# Patient Record
Sex: FEMALE | Race: WHITE
[De-identification: names, ages, dates, MRNs, and addresses within clinical notes are randomized per-mention and may not be internally consistent; named-entity substitution may affect disease eponyms.]

---

## 2019-02-13 ENCOUNTER — HOSPITAL ENCOUNTER (EMERGENCY)
Dept: HOSPITAL 96 - M.ERS | Age: 59
Discharge: HOME | End: 2019-02-13
Payer: COMMERCIAL

## 2019-02-13 VITALS — DIASTOLIC BLOOD PRESSURE: 56 MMHG | SYSTOLIC BLOOD PRESSURE: 109 MMHG

## 2019-02-13 VITALS — WEIGHT: 168 LBS | HEIGHT: 66 IN | BODY MASS INDEX: 27 KG/M2

## 2019-02-13 DIAGNOSIS — B34.9: ICD-10-CM

## 2019-02-13 DIAGNOSIS — Z90.710: ICD-10-CM

## 2019-02-13 DIAGNOSIS — Z88.8: ICD-10-CM

## 2019-02-13 DIAGNOSIS — J45.909: ICD-10-CM

## 2019-02-13 DIAGNOSIS — Z88.6: ICD-10-CM

## 2019-02-13 DIAGNOSIS — J43.9: Primary | ICD-10-CM

## 2019-02-13 DIAGNOSIS — Z88.5: ICD-10-CM

## 2019-02-13 DIAGNOSIS — J20.9: ICD-10-CM

## 2019-02-13 LAB
ABSOLUTE MONOCYTES: 0.6 THOU/UL (ref 0–1.2)
ALBUMIN SERPL-MCNC: 3.9 G/DL (ref 3.4–5)
ALP SERPL-CCNC: 101 U/L (ref 46–116)
ALT SERPL-CCNC: 41 U/L (ref 30–65)
ANION GAP SERPL CALC-SCNC: 10 MMOL/L (ref 7–16)
ANISOCYTOSIS BLD QL SMEAR: (no result)
APTT BLD: 27.1 SECONDS (ref 25–31.3)
AST SERPL-CCNC: 34 U/L (ref 15–37)
BILIRUB SERPL-MCNC: 0.2 MG/DL
BUN SERPL-MCNC: 13 MG/DL (ref 7–18)
CALCIUM SERPL-MCNC: 9 MG/DL (ref 8.5–10.1)
CHLORIDE SERPL-SCNC: 101 MMOL/L (ref 98–107)
CO2 SERPL-SCNC: 26 MMOL/L (ref 21–32)
CREAT SERPL-MCNC: 0.9 MG/DL (ref 0.6–1.3)
GLUCOSE SERPL-MCNC: 118 MG/DL (ref 70–99)
GRANULOCYTES NFR BLD MANUAL: 84 %
HCT VFR BLD CALC: 45.5 % (ref 37–47)
HGB BLD-MCNC: 15.5 GM/DL (ref 12–15)
INR PPP: 1
LYMPHOCYTES # BLD: 0.5 THOU/UL (ref 0.8–5.3)
LYMPHOCYTES NFR BLD AUTO: 7 %
MCH RBC QN AUTO: 32 PG (ref 26–34)
MCHC RBC AUTO-ENTMCNC: 34.1 G/DL (ref 28–37)
MCV RBC: 93.7 FL (ref 80–100)
MONOCYTES NFR BLD: 9 %
MPV: 11.7 FL. (ref 7.2–11.1)
NEUTROPHILS # BLD: 5.8 THOU/UL (ref 1.6–8.1)
NT-PRO BRAIN NAT PEPTIDE: 86 PG/ML (ref ?–300)
NUCLEATED RBCS: 0 /100WBC
PLATELET # BLD EST: ADEQUATE 10*3/UL
PLATELET COUNT*: 115 THOU/UL (ref 150–400)
POTASSIUM SERPL-SCNC: 3.5 MMOL/L (ref 3.5–5.1)
PROT SERPL-MCNC: 8.1 G/DL (ref 6.4–8.2)
PROTHROMBIN TIME: 10 SECONDS (ref 9.2–11.5)
RBC # BLD AUTO: 4.85 MIL/UL (ref 4.2–5)
RDW-CV: 13.7 % (ref 10.5–14.5)
SODIUM SERPL-SCNC: 137 MMOL/L (ref 136–145)
TROPONIN-I LEVEL: <0.06 NG/ML (ref ?–0.06)
WBC # BLD AUTO: 6.9 THOU/UL (ref 4–11)

## 2019-02-14 NOTE — EKG
[place] [address]
Phone:  [phone]                     ELECTROCARDIOGRAM REPORT      
_______________________________________________________________________________
 
Name:       PRINCESS WILDE KATY                 Room:                      [facility]#:  [id number]      Account #:      [id number]  
Admission:  19     Attend Phys:                         
Discharge:  19     Date of Birth:  [birth year]  
         Report #: [id number]
    [id number]
_______________________________________________________________________________
THIS REPORT FOR:  //name//                      
 
                         [facility] ED
                                       
Test Date:    2019               Test Time:    19:27:53
Pat Name:     PRINCESS WILDE             Department:   
Patient ID:   [id number]            Room:          
Gender:       F                        Technician:   MARCEL ALEXANDER
:          1960               Requested By: Palua Boudreaux
Order Number: [id number] MD:   Albert Vega
                                 Measurements
Intervals                              Axis          
Rate:         140                      P:            96
NE:           72                       QRS:          -29
QRSD:         237                      T:            68
QT:           381                                    
QTc:          582                                    
                           Interpretive Statements
Sinus tachycardia
Consider right atrial enlargement
IVCD, consider atypical RBBB
 
Compared to ECG 10/15/2015 13:20:26
 
Sinus rhythm no longer present
 
Electronically Signed On 2019 13:08:23 CST by Albert Vega
[email or web address]
 
 
 
 
 
 
 
 
 
 
 
 
 
 
 
  <ELECTRONICALLY SIGNED>
                                           By: Albert Vega MD, [facility]      
  19     1308
D: 19   _____________________________________
T: 19   Albert Vega MD, [facility]        /EPI